# Patient Record
Sex: FEMALE | Race: WHITE | URBAN - METROPOLITAN AREA
[De-identification: names, ages, dates, MRNs, and addresses within clinical notes are randomized per-mention and may not be internally consistent; named-entity substitution may affect disease eponyms.]

---

## 2021-12-23 ENCOUNTER — EMERGENCY (EMERGENCY)
Facility: HOSPITAL | Age: 51
LOS: 0 days | Discharge: HOME | End: 2021-12-23
Attending: EMERGENCY MEDICINE | Admitting: EMERGENCY MEDICINE
Payer: COMMERCIAL

## 2021-12-23 VITALS
DIASTOLIC BLOOD PRESSURE: 81 MMHG | TEMPERATURE: 98 F | OXYGEN SATURATION: 100 % | HEART RATE: 95 BPM | SYSTOLIC BLOOD PRESSURE: 157 MMHG | RESPIRATION RATE: 18 BRPM

## 2021-12-23 DIAGNOSIS — I49.1 ATRIAL PREMATURE DEPOLARIZATION: ICD-10-CM

## 2021-12-23 DIAGNOSIS — R00.2 PALPITATIONS: ICD-10-CM

## 2021-12-23 DIAGNOSIS — F17.210 NICOTINE DEPENDENCE, CIGARETTES, UNCOMPLICATED: ICD-10-CM

## 2021-12-23 LAB
ALBUMIN SERPL ELPH-MCNC: 4 G/DL — SIGNIFICANT CHANGE UP (ref 3.5–5.2)
ALP SERPL-CCNC: 76 U/L — SIGNIFICANT CHANGE UP (ref 30–115)
ALT FLD-CCNC: 28 U/L — SIGNIFICANT CHANGE UP (ref 0–41)
ANION GAP SERPL CALC-SCNC: 11 MMOL/L — SIGNIFICANT CHANGE UP (ref 7–14)
AST SERPL-CCNC: 19 U/L — SIGNIFICANT CHANGE UP (ref 0–41)
BASOPHILS # BLD AUTO: 0.06 K/UL — SIGNIFICANT CHANGE UP (ref 0–0.2)
BASOPHILS NFR BLD AUTO: 0.6 % — SIGNIFICANT CHANGE UP (ref 0–1)
BILIRUB SERPL-MCNC: 0.5 MG/DL — SIGNIFICANT CHANGE UP (ref 0.2–1.2)
BUN SERPL-MCNC: 10 MG/DL — SIGNIFICANT CHANGE UP (ref 10–20)
CALCIUM SERPL-MCNC: 8.8 MG/DL — SIGNIFICANT CHANGE UP (ref 8.5–10.1)
CHLORIDE SERPL-SCNC: 100 MMOL/L — SIGNIFICANT CHANGE UP (ref 98–110)
CO2 SERPL-SCNC: 25 MMOL/L — SIGNIFICANT CHANGE UP (ref 17–32)
CREAT SERPL-MCNC: 0.8 MG/DL — SIGNIFICANT CHANGE UP (ref 0.7–1.5)
EOSINOPHIL # BLD AUTO: 0.26 K/UL — SIGNIFICANT CHANGE UP (ref 0–0.7)
EOSINOPHIL NFR BLD AUTO: 2.7 % — SIGNIFICANT CHANGE UP (ref 0–8)
GLUCOSE SERPL-MCNC: 158 MG/DL — HIGH (ref 70–99)
HCG SERPL QL: NEGATIVE — SIGNIFICANT CHANGE UP
HCT VFR BLD CALC: 43.5 % — SIGNIFICANT CHANGE UP (ref 37–47)
HGB BLD-MCNC: 14.5 G/DL — SIGNIFICANT CHANGE UP (ref 12–16)
IMM GRANULOCYTES NFR BLD AUTO: 0.7 % — HIGH (ref 0.1–0.3)
LYMPHOCYTES # BLD AUTO: 2.39 K/UL — SIGNIFICANT CHANGE UP (ref 1.2–3.4)
LYMPHOCYTES # BLD AUTO: 24.8 % — SIGNIFICANT CHANGE UP (ref 20.5–51.1)
MAGNESIUM SERPL-MCNC: 2.1 MG/DL — SIGNIFICANT CHANGE UP (ref 1.8–2.4)
MCHC RBC-ENTMCNC: 28.5 PG — SIGNIFICANT CHANGE UP (ref 27–31)
MCHC RBC-ENTMCNC: 33.3 G/DL — SIGNIFICANT CHANGE UP (ref 32–37)
MCV RBC AUTO: 85.6 FL — SIGNIFICANT CHANGE UP (ref 81–99)
MONOCYTES # BLD AUTO: 0.5 K/UL — SIGNIFICANT CHANGE UP (ref 0.1–0.6)
MONOCYTES NFR BLD AUTO: 5.2 % — SIGNIFICANT CHANGE UP (ref 1.7–9.3)
NEUTROPHILS # BLD AUTO: 6.37 K/UL — SIGNIFICANT CHANGE UP (ref 1.4–6.5)
NEUTROPHILS NFR BLD AUTO: 66 % — SIGNIFICANT CHANGE UP (ref 42.2–75.2)
NRBC # BLD: 0 /100 WBCS — SIGNIFICANT CHANGE UP (ref 0–0)
PLATELET # BLD AUTO: 280 K/UL — SIGNIFICANT CHANGE UP (ref 130–400)
POTASSIUM SERPL-MCNC: 4.3 MMOL/L — SIGNIFICANT CHANGE UP (ref 3.5–5)
POTASSIUM SERPL-SCNC: 4.3 MMOL/L — SIGNIFICANT CHANGE UP (ref 3.5–5)
PROT SERPL-MCNC: 6.9 G/DL — SIGNIFICANT CHANGE UP (ref 6–8)
RBC # BLD: 5.08 M/UL — SIGNIFICANT CHANGE UP (ref 4.2–5.4)
RBC # FLD: 14.7 % — HIGH (ref 11.5–14.5)
SODIUM SERPL-SCNC: 136 MMOL/L — SIGNIFICANT CHANGE UP (ref 135–146)
TROPONIN T SERPL-MCNC: <0.01 NG/ML — SIGNIFICANT CHANGE UP
WBC # BLD: 9.65 K/UL — SIGNIFICANT CHANGE UP (ref 4.8–10.8)
WBC # FLD AUTO: 9.65 K/UL — SIGNIFICANT CHANGE UP (ref 4.8–10.8)

## 2021-12-23 PROCEDURE — 93010 ELECTROCARDIOGRAM REPORT: CPT

## 2021-12-23 PROCEDURE — 99285 EMERGENCY DEPT VISIT HI MDM: CPT

## 2021-12-23 PROCEDURE — 71046 X-RAY EXAM CHEST 2 VIEWS: CPT | Mod: 26

## 2021-12-23 RX ORDER — METOPROLOL TARTRATE 50 MG
25 TABLET ORAL ONCE
Refills: 0 | Status: COMPLETED | OUTPATIENT
Start: 2021-12-23 | End: 2021-12-23

## 2021-12-23 RX ORDER — SODIUM CHLORIDE 9 MG/ML
1000 INJECTION, SOLUTION INTRAVENOUS ONCE
Refills: 0 | Status: COMPLETED | OUTPATIENT
Start: 2021-12-23 | End: 2021-12-23

## 2021-12-23 RX ORDER — METOPROLOL TARTRATE 50 MG
1 TABLET ORAL
Qty: 30 | Refills: 0
Start: 2021-12-23 | End: 2022-01-21

## 2021-12-23 RX ADMIN — SODIUM CHLORIDE 1000 MILLILITER(S): 9 INJECTION, SOLUTION INTRAVENOUS at 14:32

## 2021-12-23 RX ADMIN — Medication 25 MILLIGRAM(S): at 14:32

## 2021-12-23 NOTE — ED PROVIDER NOTE - OBJECTIVE STATEMENT
Pt is a 52y/o female with no sig pmhx, current cigarette smoker here for eval of intermittent episodes of palpitations that began yesterday afternoon while at rest with no aggravating/alleviating factors. Pt denies CP, SOB, Abd pain, n/v/d.

## 2021-12-23 NOTE — ED PROVIDER NOTE - ATTENDING CONTRIBUTION TO CARE
I personally evaluated patient. I agree with the findings and plan with all documentation on chart except as documented  in my note.    52 y/o healthy female who presents with palpitations. No fever or signs of infectious etiology. On rhythm strip, patient feels when she is having PAC's. No SOB or CP. Physical exam normal. IV placed, Metoprolol given, patient monitored. Labs unremarkable. EKG normal.    Patient is a good candidate to attempt outpatient management. Supportive care and home care discussed in detail. Patient aware they may have to return for re-evaluation and possible admission if outpatient treatment fails. Strict return precautions discussed. EP follow up discussed (patient had prior Holter monitor for 48 hours)    Full DC instructions discussed and patient knows when to seek immediate medical attention.  Patient has proper follow up.  All results discussed and patient aware they may require further work up.  Proper follow up ensured. Limitations of ED work up discussed.  Medications administered and prescribed/OTC home meds discussed.  All questions and concerns from patient or family addressed. Understanding of instructions verbalized.

## 2021-12-23 NOTE — ED PROVIDER NOTE - NS ED ROS FT
Eyes:  No visual changes, eye pain or discharge.  ENMT:  No hearing changes, pain, discharge or infections. No neck pain or stiffness.  Cardiac:  + palpitations No chest pain, SOB or edema. No chest pain with exertion.  Respiratory:  No cough or respiratory distress. No hemoptysis. No history of asthma or RAD.  GI:  No nausea, vomiting, diarrhea or abdominal pain.  :  No dysuria, frequency or burning.  MS:  No myalgia, muscle weakness, joint pain or back pain.  Neuro:  No headache or weakness.  No LOC.  Skin:  No skin rash.   Endocrine: No history of thyroid disease or diabetes.  Except as documented in the HPI,  all other systems are negative.

## 2021-12-23 NOTE — ED PROVIDER NOTE - CARE PROVIDERS DIRECT ADDRESSES
,DirectAddress_Unknown,[email or web address]

## 2021-12-23 NOTE — ED PROVIDER NOTE - CARE PROVIDER_API CALL
Yaw Manuel)  Cardiovascular Disease; Interventional Cardiology  [address]  [place] [address]  Phone: [phone]  Fax: [phone]  Follow Up Time:     Bryn Sanders; RAYMOND)  CardiologyElectrophyslgy [address]  [place] [address]  Phone: [phone]  Fax: [phone]  Follow Up Time:

## 2021-12-23 NOTE — ED PROVIDER NOTE - PATIENT PORTAL LINK FT
You can access the FollowMyHealth Patient Portal offered by [facility] by registering at the following website: [email or web address]. By joining [employer]’s FollowMyHealth portal, you will also be able to view your health information using other applications (apps) compatible with our system.

## 2021-12-28 ENCOUNTER — LABORATORY RESULT (OUTPATIENT)
Age: 51
End: 2021-12-28

## 2021-12-28 ENCOUNTER — APPOINTMENT (OUTPATIENT)
Dept: OBGYN | Facility: CLINIC | Age: 51
End: 2021-12-28
Payer: COMMERCIAL

## 2021-12-28 ENCOUNTER — RESULT CHARGE (OUTPATIENT)
Age: 51
End: 2021-12-28

## 2021-12-28 VITALS
HEART RATE: 93 BPM | BODY MASS INDEX: 35.32 KG/M2 | HEIGHT: 65 IN | TEMPERATURE: 98.6 F | WEIGHT: 212 LBS | OXYGEN SATURATION: 98 %

## 2021-12-28 VITALS — SYSTOLIC BLOOD PRESSURE: 114 MMHG | DIASTOLIC BLOOD PRESSURE: 72 MMHG

## 2021-12-28 DIAGNOSIS — F17.210 NICOTINE DEPENDENCE, CIGARETTES, UNCOMPLICATED: ICD-10-CM

## 2021-12-28 PROBLEM — Z00.00 ENCOUNTER FOR PREVENTIVE HEALTH EXAMINATION: Status: ACTIVE | Noted: 2021-12-28

## 2021-12-28 LAB
BILIRUB UR QL STRIP: NORMAL
GLUCOSE UR-MCNC: NORMAL
HGB UR QL STRIP.AUTO: NORMAL
KETONES UR-MCNC: NORMAL
LEUKOCYTE ESTERASE UR QL STRIP: NORMAL
NITRITE UR QL STRIP: NORMAL
PROT UR STRIP-MCNC: NORMAL

## 2021-12-28 PROCEDURE — 81003 URINALYSIS AUTO W/O SCOPE: CPT | Mod: QW

## 2021-12-28 PROCEDURE — 99396 PREV VISIT EST AGE 40-64: CPT

## 2021-12-28 NOTE — HISTORY OF PRESENT ILLNESS
[Patient reported PAP Smear was normal] : Patient reported PAP Smear was normal [Gonorrhea test offered] : Gonorrhea test offered [Trichomonas test offered] : Trichomonas test offered [HPV test offered] : HPV test offered [Chlamydia test offered] : Chlamydia test offered [N] : Patient does not use contraception [Y] : Patient is sexually active [TextBox_4] : Pt present for annual gyn exam [Mammogramdate] : 2017 [PapSmeardate] : 8/6/2018 [BoneDensityDate] : n/a [ColonoscopyDate] : -0- [MensesFreq] : 28 [MensesLength] : 4 [PGHxTotal] : 2 [[facility]FullTerm] : 2 [[facility] Hospital[facility]Living] : 2 [FreeTextEntry1] : C/S x 2

## 2021-12-28 NOTE — PLAN
[FreeTextEntry1] : Pap, GC, Chlamydia\par TVS\par Mammo\par Schedule Colonoscopy\par UA C&S\par RTO 6 mos/prn

## 2021-12-28 NOTE — COUNSELING
[Nutrition/ Exercise/ Weight Management] : nutrition, exercise, weight management [Body Image] : body image [Vitamins/Supplements] : vitamins/supplements [Smoking Cessation] : smoking cessation [Breast Self Exam] : breast self exam

## 2022-08-01 ENCOUNTER — APPOINTMENT (OUTPATIENT)
Dept: OBGYN | Facility: CLINIC | Age: 52
End: 2022-08-01

## 2022-08-01 VITALS
HEART RATE: 98 BPM | WEIGHT: 215 LBS | OXYGEN SATURATION: 98 % | SYSTOLIC BLOOD PRESSURE: 110 MMHG | BODY MASS INDEX: 35.82 KG/M2 | TEMPERATURE: 98.7 F | HEIGHT: 65 IN | DIASTOLIC BLOOD PRESSURE: 70 MMHG

## 2022-08-01 PROCEDURE — 81003 URINALYSIS AUTO W/O SCOPE: CPT | Mod: QW

## 2022-08-01 PROCEDURE — 99214 OFFICE O/P EST MOD 30 MIN: CPT

## 2022-08-01 NOTE — HISTORY OF PRESENT ILLNESS
[Patient reported PAP Smear was normal] : Patient reported PAP Smear was normal [Y] : Positive pregnancy history [TextBox_4] : Pt present to discuss Perimenopause [Mammogramdate] : 0 [PapSmeardate] : 12/2021 [BoneDensityDate] : 0 [ColonoscopyDate] : 0 [MensesFreq] : 28 [MensesLength] : 5 [PGHxTotal] : 2 [[facility]xFullTerm] : 2 [[facility]xLiving] : 2 [FreeTextEntry1] : c/s [Insomnia] : insomnia [No] : none [Herbal Options] : herbal options [Behavioral Modification] : behavioral modification

## 2022-08-01 NOTE — PLAN
[FreeTextEntry1] : Monitor cycle, no blood wk at present unless cycles become irregular\par Pt had blood work drawn PCP and was advised to take Metformin for elevated glucose/HgbA1C which she has not started; advised to follow advice of PCP re meds and F/U with him ASAP\par Schedule Colonoscopy\par Mammo\par RTO 12/22 for annual gyn exam

## 2024-03-25 ENCOUNTER — EMERGENCY (EMERGENCY)
Facility: HOSPITAL | Age: 54
LOS: 0 days | Discharge: HOME | End: 2024-03-25
Attending: EMERGENCY MEDICINE
Payer: COMMERCIAL

## 2024-03-25 VITALS
HEART RATE: 118 BPM | RESPIRATION RATE: 18 BRPM | DIASTOLIC BLOOD PRESSURE: 87 MMHG | OXYGEN SATURATION: 98 % | WEIGHT: 220.02 LBS | TEMPERATURE: 98 F | SYSTOLIC BLOOD PRESSURE: 157 MMHG | HEIGHT: 66 IN

## 2024-03-25 VITALS
RESPIRATION RATE: 16 BRPM | HEART RATE: 86 BPM | DIASTOLIC BLOOD PRESSURE: 62 MMHG | SYSTOLIC BLOOD PRESSURE: 114 MMHG | OXYGEN SATURATION: 97 %

## 2024-03-25 DIAGNOSIS — R00.2 PALPITATIONS: ICD-10-CM

## 2024-03-25 DIAGNOSIS — R00.0 TACHYCARDIA, UNSPECIFIED: ICD-10-CM

## 2024-03-25 DIAGNOSIS — Z98.891 HISTORY OF UTERINE SCAR FROM PREVIOUS SURGERY: Chronic | ICD-10-CM

## 2024-03-25 DIAGNOSIS — I49.1 ATRIAL PREMATURE DEPOLARIZATION: ICD-10-CM

## 2024-03-25 DIAGNOSIS — F17.210 NICOTINE DEPENDENCE, CIGARETTES, UNCOMPLICATED: ICD-10-CM

## 2024-03-25 LAB
ALBUMIN SERPL ELPH-MCNC: 4.3 G/DL — SIGNIFICANT CHANGE UP (ref 3.5–5.2)
ALP SERPL-CCNC: 88 U/L — SIGNIFICANT CHANGE UP (ref 30–115)
ALT FLD-CCNC: 30 U/L — SIGNIFICANT CHANGE UP (ref 0–41)
ANION GAP SERPL CALC-SCNC: 12 MMOL/L — SIGNIFICANT CHANGE UP (ref 7–14)
AST SERPL-CCNC: 19 U/L — SIGNIFICANT CHANGE UP (ref 0–41)
BASOPHILS # BLD AUTO: 0.05 K/UL — SIGNIFICANT CHANGE UP (ref 0–0.2)
BASOPHILS NFR BLD AUTO: 0.7 % — SIGNIFICANT CHANGE UP (ref 0–1)
BILIRUB SERPL-MCNC: 0.5 MG/DL — SIGNIFICANT CHANGE UP (ref 0.2–1.2)
BUN SERPL-MCNC: 12 MG/DL — SIGNIFICANT CHANGE UP (ref 10–20)
CALCIUM SERPL-MCNC: 9.4 MG/DL — SIGNIFICANT CHANGE UP (ref 8.4–10.5)
CHLORIDE SERPL-SCNC: 99 MMOL/L — SIGNIFICANT CHANGE UP (ref 98–110)
CO2 SERPL-SCNC: 26 MMOL/L — SIGNIFICANT CHANGE UP (ref 17–32)
CREAT SERPL-MCNC: 0.9 MG/DL — SIGNIFICANT CHANGE UP (ref 0.7–1.5)
EGFR: 77 ML/MIN/1.73M2 — SIGNIFICANT CHANGE UP
EOSINOPHIL # BLD AUTO: 0.24 K/UL — SIGNIFICANT CHANGE UP (ref 0–0.7)
EOSINOPHIL NFR BLD AUTO: 3.2 % — SIGNIFICANT CHANGE UP (ref 0–8)
GLUCOSE SERPL-MCNC: 183 MG/DL — HIGH (ref 70–99)
HCG SERPL QL: NEGATIVE — SIGNIFICANT CHANGE UP
HCT VFR BLD CALC: 46 % — SIGNIFICANT CHANGE UP (ref 37–47)
HGB BLD-MCNC: 15.7 G/DL — SIGNIFICANT CHANGE UP (ref 12–16)
IMM GRANULOCYTES NFR BLD AUTO: 0.4 % — HIGH (ref 0.1–0.3)
LYMPHOCYTES # BLD AUTO: 1.65 K/UL — SIGNIFICANT CHANGE UP (ref 1.2–3.4)
LYMPHOCYTES # BLD AUTO: 21.8 % — SIGNIFICANT CHANGE UP (ref 20.5–51.1)
MAGNESIUM SERPL-MCNC: 1.9 MG/DL — SIGNIFICANT CHANGE UP (ref 1.8–2.4)
MCHC RBC-ENTMCNC: 29.1 PG — SIGNIFICANT CHANGE UP (ref 27–31)
MCHC RBC-ENTMCNC: 34.1 G/DL — SIGNIFICANT CHANGE UP (ref 32–37)
MCV RBC AUTO: 85.3 FL — SIGNIFICANT CHANGE UP (ref 81–99)
MONOCYTES # BLD AUTO: 0.46 K/UL — SIGNIFICANT CHANGE UP (ref 0.1–0.6)
MONOCYTES NFR BLD AUTO: 6.1 % — SIGNIFICANT CHANGE UP (ref 1.7–9.3)
NEUTROPHILS # BLD AUTO: 5.14 K/UL — SIGNIFICANT CHANGE UP (ref 1.4–6.5)
NEUTROPHILS NFR BLD AUTO: 67.8 % — SIGNIFICANT CHANGE UP (ref 42.2–75.2)
NRBC # BLD: 0 /100 WBCS — SIGNIFICANT CHANGE UP (ref 0–0)
PLATELET # BLD AUTO: 262 K/UL — SIGNIFICANT CHANGE UP (ref 130–400)
PMV BLD: 9.7 FL — SIGNIFICANT CHANGE UP (ref 7.4–10.4)
POTASSIUM SERPL-MCNC: 4.6 MMOL/L — SIGNIFICANT CHANGE UP (ref 3.5–5)
POTASSIUM SERPL-SCNC: 4.6 MMOL/L — SIGNIFICANT CHANGE UP (ref 3.5–5)
PROT SERPL-MCNC: 7 G/DL — SIGNIFICANT CHANGE UP (ref 6–8)
RBC # BLD: 5.39 M/UL — SIGNIFICANT CHANGE UP (ref 4.2–5.4)
RBC # FLD: 13.9 % — SIGNIFICANT CHANGE UP (ref 11.5–14.5)
SODIUM SERPL-SCNC: 137 MMOL/L — SIGNIFICANT CHANGE UP (ref 135–146)
WBC # BLD: 7.57 K/UL — SIGNIFICANT CHANGE UP (ref 4.8–10.8)
WBC # FLD AUTO: 7.57 K/UL — SIGNIFICANT CHANGE UP (ref 4.8–10.8)

## 2024-03-25 PROCEDURE — 80053 COMPREHEN METABOLIC PANEL: CPT

## 2024-03-25 PROCEDURE — 71045 X-RAY EXAM CHEST 1 VIEW: CPT | Mod: 26

## 2024-03-25 PROCEDURE — 71045 X-RAY EXAM CHEST 1 VIEW: CPT

## 2024-03-25 PROCEDURE — 93010 ELECTROCARDIOGRAM REPORT: CPT

## 2024-03-25 PROCEDURE — 93005 ELECTROCARDIOGRAM TRACING: CPT

## 2024-03-25 PROCEDURE — 99284 EMERGENCY DEPT VISIT MOD MDM: CPT

## 2024-03-25 PROCEDURE — 36415 COLL VENOUS BLD VENIPUNCTURE: CPT

## 2024-03-25 PROCEDURE — 85025 COMPLETE CBC W/AUTO DIFF WBC: CPT

## 2024-03-25 PROCEDURE — 83735 ASSAY OF MAGNESIUM: CPT

## 2024-03-25 PROCEDURE — 99285 EMERGENCY DEPT VISIT HI MDM: CPT | Mod: 25

## 2024-03-25 PROCEDURE — 84703 CHORIONIC GONADOTROPIN ASSAY: CPT

## 2024-03-25 RX ORDER — METOPROLOL TARTRATE 50 MG
0.5 TABLET ORAL
Qty: 7 | Refills: 0
Start: 2024-03-25 | End: 2024-03-31

## 2024-03-25 RX ORDER — SODIUM CHLORIDE 9 MG/ML
1000 INJECTION INTRAMUSCULAR; INTRAVENOUS; SUBCUTANEOUS ONCE
Refills: 0 | Status: COMPLETED | OUTPATIENT
Start: 2024-03-25 | End: 2024-03-25

## 2024-03-25 RX ORDER — METOPROLOL TARTRATE 50 MG
12.5 TABLET ORAL ONCE
Refills: 0 | Status: DISCONTINUED | OUTPATIENT
Start: 2024-03-25 | End: 2024-03-25

## 2024-03-25 RX ORDER — ALPRAZOLAM 0.25 MG
0.25 TABLET ORAL ONCE
Refills: 0 | Status: DISCONTINUED | OUTPATIENT
Start: 2024-03-25 | End: 2024-03-25

## 2024-03-25 RX ORDER — METOPROLOL TARTRATE 50 MG
25 TABLET ORAL ONCE
Refills: 0 | Status: COMPLETED | OUTPATIENT
Start: 2024-03-25 | End: 2024-03-25

## 2024-03-25 RX ADMIN — SODIUM CHLORIDE 1000 MILLILITER(S): 9 INJECTION INTRAMUSCULAR; INTRAVENOUS; SUBCUTANEOUS at 09:44

## 2024-03-25 RX ADMIN — Medication 25 MILLIGRAM(S): at 10:58

## 2024-03-25 RX ADMIN — Medication 0.25 MILLIGRAM(S): at 09:44

## 2024-03-25 NOTE — ED PROVIDER NOTE - PATIENT PORTAL LINK FT
You can access the FollowMyHealth Patient Portal offered by [facility] by registering at the following website: [email or web address]. By joining [employer]’s FollowMyHealth portal, you will also be able to view your health information using other applications (apps) compatible with our system.

## 2024-03-25 NOTE — ED PROVIDER NOTE - EKG/XRAY ADDITIONAL INFORMATION
EKG shows sinus tachycardia at 104.  There are occasional premature supraventricular complexes.  Axis is normal.  There is no ischemic changes. EKG shows sinus tachycardia at 104.  There are occasional premature supraventricular complexes.  Axis is normal.  There are no ischemic changes.    Repeat EKG shows rate 95.

## 2024-03-25 NOTE — ED ADULT NURSE NOTE - NSFALLUNIVINTERV_ED_ALL_ED
Bed/Stretcher in lowest position, wheels locked, appropriate side rails in place/Call bell, personal items and telephone in reach/Instruct patient to call for assistance before getting out of bed/chair/stretcher/Non-slip footwear applied when patient is off stretcher/[place] to call system/Physically safe environment - no spills, clutter or unnecessary equipment/Purposeful proactive rounding/Room/bathroom lighting operational, light cord in reach

## 2024-03-25 NOTE — ED PROVIDER NOTE - OBJECTIVE STATEMENT
54 y/o female current smoker presents to the ED with palpitations since last night. patient took semaglutide injection at 1700 yesterday for first time. patient c/o skipping beats and racing heart beat. no chest pain or sob. patient feeling anxious. no tingling to extremities. no back pain , dizziness.

## 2024-03-25 NOTE — ED PROVIDER NOTE - NSFOLLOWUPINSTRUCTIONS_ED_ALL_ED_FT
Our Emergency Department Referral Coordinators will be reaching out to you in the next 24-48 hours from 9:00am to 5:00pm with a follow up appointment. Please expect a phone call from the hospital in that time frame. If you do not receive a call or if you have any questions or concerns, you can reach them at   [phone]        Palpitations    A palpitation is the feeling that your heartbeat is irregular or is faster than normal. It may feel like your heart is fluttering or skipping a beat. They may be caused by many things, including smoking, caffeine, alcohol, stress, and certain medicines. Although most causes of palpitations are not serious, palpitations can be a sign of a serious medical problem. Avoid caffeine, alcohol, and tobacco products at home. Try to reduce stress and anxiety and make sure to get plenty of rest.     SEEK IMMEDIATE MEDICAL CARE IF YOU HAVE ANY OF THE FOLLOWING SYMPTOMS: chest pain, shortness of breath, severe headache, dizziness/lightheadedness, or fainting.

## 2024-03-25 NOTE — ED PROVIDER NOTE - CARE PROVIDER_API CALL
Eladio Hay  Pulmonary Disease  [address] [place] [id number]  Phone: [phone]  Fax: [phone]  Follow Up Time:

## 2024-03-25 NOTE — ED PROVIDER NOTE - PHYSICAL EXAMINATION
generalized: normocephalic , well appearing, anxious  eyes: PERRLA, EOMI, clear conjunctiva  resp: CTA, no resp distress, no chest wall tenderness or crepitation  cardiac: tachycardic, regular rhythm,  S1S2, no murmurs, +extrasystoles  abdomen: NT/ND, BSx4, no CVA tenderness, no palpable mass  msk: no calf tenderness or swelling  skin: no redness or rashes  neuro: AOx3, gait steady, speech clear, motor and sensory in tact

## 2024-03-25 NOTE — ED PROVIDER NOTE - ATTENDING APP SHARED VISIT CONTRIBUTION OF CARE
Patient complains of palpitations.  She has had this complaint in the past with a negative workup by her PMD.  She states the symptoms occurred after taking Ozempic.  She denies rash, shortness of breath, chest pain.  Vital signs noted.  On cardiac monitor heart rate varies between 90 and 110 during my examination.  Is 0-1 PACs noted.  Patient is a has no fever.  He has had no infectious complaints.  ENT no throat redness. Chest clear.  Heart regular rate no murmur.  Abdomen nontender.  There is no goiter.  Diagnostic workup reviewed.  There is no ischemia on EKG.  White blood cell count is not elevated.  Chemistry shows no metabolic derangement.  Symptoms may be due to medication effect.  However, palpitations is not a known side effect of Ozempic in my opinion.  In my opinion, obstructive sleep apnea is contemplated.  In my opinion, outpatient treatment and follow up are appropriate.

## 2024-03-27 PROBLEM — Z78.9 OTHER SPECIFIED HEALTH STATUS: Chronic | Status: ACTIVE | Noted: 2024-03-25

## 2024-04-02 NOTE — CHART NOTE - NSCHARTNOTEFT_GEN_A_CORE
vmail not set up 3/26, MV - emailed pulm 3/27, MV - schd for 4/22
vmail not set up 3/26, MV - patient would like both appts, handed off to  for cardio 3/27, MV/ schedule don 4/11/24 @ 3:30p w/ Dr. Young @ [facility] 3/27-

## 2024-04-11 ENCOUNTER — APPOINTMENT (OUTPATIENT)
Dept: CARDIOLOGY | Facility: CLINIC | Age: 54
End: 2024-04-11

## 2024-04-22 ENCOUNTER — APPOINTMENT (OUTPATIENT)
Dept: PULMONOLOGY | Facility: CLINIC | Age: 54
End: 2024-04-22
Payer: COMMERCIAL

## 2024-04-22 VITALS
OXYGEN SATURATION: 97 % | BODY MASS INDEX: 37.44 KG/M2 | HEART RATE: 112 BPM | SYSTOLIC BLOOD PRESSURE: 130 MMHG | DIASTOLIC BLOOD PRESSURE: 70 MMHG | WEIGHT: 225 LBS

## 2024-04-22 DIAGNOSIS — G47.33 OBSTRUCTIVE SLEEP APNEA (ADULT) (PEDIATRIC): ICD-10-CM

## 2024-04-22 DIAGNOSIS — Z87.2 PERSONAL HISTORY OF DISEASES OF THE SKIN AND SUBCUTANEOUS TISSUE: ICD-10-CM

## 2024-04-22 DIAGNOSIS — F17.200 NICOTINE DEPENDENCE, UNSPECIFIED, UNCOMPLICATED: ICD-10-CM

## 2024-04-22 DIAGNOSIS — Z81.8 FAMILY HISTORY OF OTHER MENTAL AND BEHAVIORAL DISORDERS: ICD-10-CM

## 2024-04-22 DIAGNOSIS — E66.01 MORBID (SEVERE) OBESITY DUE TO EXCESS CALORIES: ICD-10-CM

## 2024-04-22 DIAGNOSIS — Z12.2 ENCOUNTER FOR SCREENING FOR MALIGNANT NEOPLASM OF RESPIRATORY ORGANS: ICD-10-CM

## 2024-04-22 PROCEDURE — G0296 VISIT TO DETERM LDCT ELIG: CPT

## 2024-04-22 PROCEDURE — 99203 OFFICE O/P NEW LOW 30 MIN: CPT

## 2024-04-22 NOTE — PLAN
[TextEntry] : PFT ordered  Counseled for smoking cessation Low-dose CT scan ordered told the patient to call me if she do it will proceed with sleep study  counseled for weight reduction  told not to drive if feel tired  counseled for sleep hygiene

## 2024-04-22 NOTE — HISTORY OF PRESENT ILLNESS
[Initial Evaluation] : an initial evaluation of [Excessive Daytime Sleepiness] : excessive daytime sleepiness [Witnessed Apnea During Sleep] : witnessed apnea during sleep [Snoring] : snoring [Unrefreshing Sleep] : unrefreshing sleep [Sleepy When Sedentary] : sleepy when sedentary [Irritability] : irritability [Currently Experiencing] : The patient is currently experiencing symptoms. [Obesity] : obesity

## 2024-04-22 NOTE — REASON FOR VISIT
[Initial] : an initial visit [Sleep Apnea] : sleep apnea [TextEntry] : This is a 53-year-old female present for evaluation for sleep apnea.  Patient reported that she has all the lower symptoms of ADDIE such as snoring at night wake up multiple at night time at night irritability wake up sometimes she feels like she will be gasping or choking. Also complaining of morning fatigue and daytime somnolence patient been gaining weight and not able to lose weight. She denies any cough wheezing or shortness of breath Patient she is a smoker been smoking for more than 25 years 1 pack/day told her that she meets criteria for screening yearly CAT scan and she agreed to follow

## 2024-04-24 ENCOUNTER — APPOINTMENT (OUTPATIENT)
Dept: CARDIOLOGY | Facility: CLINIC | Age: 54
End: 2024-04-24
Payer: COMMERCIAL

## 2024-04-24 VITALS
HEART RATE: 114 BPM | SYSTOLIC BLOOD PRESSURE: 124 MMHG | HEIGHT: 65 IN | WEIGHT: 225 LBS | DIASTOLIC BLOOD PRESSURE: 78 MMHG | BODY MASS INDEX: 37.49 KG/M2

## 2024-04-24 DIAGNOSIS — R94.31 ABNORMAL ELECTROCARDIOGRAM [ECG] [EKG]: ICD-10-CM

## 2024-04-24 DIAGNOSIS — E78.5 HYPERLIPIDEMIA, UNSPECIFIED: ICD-10-CM

## 2024-04-24 DIAGNOSIS — R06.02 SHORTNESS OF BREATH: ICD-10-CM

## 2024-04-24 DIAGNOSIS — R00.2 PALPITATIONS: ICD-10-CM

## 2024-04-24 PROCEDURE — 93000 ELECTROCARDIOGRAM COMPLETE: CPT

## 2024-04-24 PROCEDURE — 99204 OFFICE O/P NEW MOD 45 MIN: CPT | Mod: 25

## 2024-04-25 NOTE — REVIEW OF SYSTEMS
You may resume normal diet as tolerated  You may not operate a vehicle during the next 24 hours  You may not engage in an occupation involving machinery or appliances during the next 24 hours  You may not drink any alcoholic beverages during the next 24 hours  You may immediately resume taking medications that have been previously prescribed by your physician     If you have any problems relating to your treatment recovery call your Psychiatrist or ECT RN Coordinator at [phone] or to [phone].     Please make an appointment with your primary psychiatrist for a follow up appointment.    [Fever] : no fever [Chills] : no chills [SOB] : no shortness of breath [Dyspnea on exertion] : dyspnea during exertion [Chest Discomfort] : no chest discomfort [Lower Ext Edema] : no extremity edema [Palpitations] : palpitations [Syncope] : no syncope [Convulsions] : no convulsions [Confusion] : no confusion was observed [Negative] : Gastrointestinal

## 2024-04-25 NOTE — HISTORY OF PRESENT ILLNESS
[FreeTextEntry1] : I have the pleasure to see Ms Soto today for cardiology evaluation. She is a 53 year old female patient with PMHx of DL, ADDIE. She complain so mild intermittent palpitation and non specific dyspnea on exertion. denies chest pain or syncope no active sob EKG showed sinus rhythm non specific changes  currently non smoker no illicit drugs

## 2024-04-25 NOTE — ASSESSMENT
[FreeTextEntry1] : 53 year old female patient , PMHx of DL, obesity sleep apnea, here for cardiology evaluation and establish care  #Dyspnea on exertion non specific non specific changes on EKG no evidence of acute ischemia or acute decompensated heart failure at this point.  will do stress echo   #Mild intermittent palpitations not very frequent  patient advised with lifestyle modification first with decrease caffeine intake, keep hydrated, good sleep pattern and avoid stress as much as possible if symptoms worsen will consider MCOT   #DL  not compliant with diet will try diet control , exercise and weight loss will repeat lipid panel in 3m if still elevated will consider statin   Follow up in 3m  If new or worsening symptoms patient advised to seek medical attention.

## 2024-04-26 ENCOUNTER — APPOINTMENT (OUTPATIENT)
Dept: PULMONOLOGY | Facility: HOSPITAL | Age: 54
End: 2024-04-26

## 2024-06-08 ENCOUNTER — OUTPATIENT (OUTPATIENT)
Dept: OUTPATIENT SERVICES | Facility: HOSPITAL | Age: 54
LOS: 1 days | Discharge: HOME | End: 2024-06-08
Payer: COMMERCIAL

## 2024-06-08 ENCOUNTER — APPOINTMENT (OUTPATIENT)
Dept: SLEEP CENTER | Facility: HOSPITAL | Age: 54
End: 2024-06-08

## 2024-06-08 DIAGNOSIS — Z98.891 HISTORY OF UTERINE SCAR FROM PREVIOUS SURGERY: Chronic | ICD-10-CM

## 2024-06-08 DIAGNOSIS — G47.33 OBSTRUCTIVE SLEEP APNEA (ADULT) (PEDIATRIC): ICD-10-CM

## 2024-06-08 PROCEDURE — 95810 POLYSOM 6/> YRS 4/> PARAM: CPT | Mod: 26

## 2024-06-08 PROCEDURE — 95810 POLYSOM 6/> YRS 4/> PARAM: CPT

## 2024-06-11 DIAGNOSIS — G47.33 OBSTRUCTIVE SLEEP APNEA (ADULT) (PEDIATRIC): ICD-10-CM

## 2024-06-24 ENCOUNTER — APPOINTMENT (OUTPATIENT)
Dept: OBGYN | Facility: CLINIC | Age: 54
End: 2024-06-24
Payer: COMMERCIAL

## 2024-06-24 VITALS
HEART RATE: 98 BPM | TEMPERATURE: 98 F | SYSTOLIC BLOOD PRESSURE: 124 MMHG | DIASTOLIC BLOOD PRESSURE: 78 MMHG | OXYGEN SATURATION: 97 % | HEIGHT: 65 IN | WEIGHT: 220 LBS | BODY MASS INDEX: 36.65 KG/M2

## 2024-06-24 DIAGNOSIS — Z11.3 ENCOUNTER FOR SCREENING FOR INFECTIONS WITH A PREDOMINANTLY SEXUAL MODE OF TRANSMISSION: ICD-10-CM

## 2024-06-24 DIAGNOSIS — Z82.0 FAMILY HISTORY OF EPILEPSY AND OTHER DISEASES OF THE NERVOUS SYSTEM: ICD-10-CM

## 2024-06-24 DIAGNOSIS — F17.200 NICOTINE DEPENDENCE, UNSPECIFIED, UNCOMPLICATED: ICD-10-CM

## 2024-06-24 DIAGNOSIS — Z83.3 FAMILY HISTORY OF DIABETES MELLITUS: ICD-10-CM

## 2024-06-24 DIAGNOSIS — Z80.0 FAMILY HISTORY OF MALIGNANT NEOPLASM OF DIGESTIVE ORGANS: ICD-10-CM

## 2024-06-24 DIAGNOSIS — Z01.419 ENCOUNTER FOR GYNECOLOGICAL EXAMINATION (GENERAL) (ROUTINE) W/OUT ABNORMAL FINDINGS: ICD-10-CM

## 2024-06-24 DIAGNOSIS — N95.1 MENOPAUSAL AND FEMALE CLIMACTERIC STATES: ICD-10-CM

## 2024-06-24 DIAGNOSIS — R31.29 OTHER MICROSCOPIC HEMATURIA: ICD-10-CM

## 2024-06-24 DIAGNOSIS — Z82.49 FAMILY HISTORY OF ISCHEMIC HEART DISEASE AND OTHER DISEASES OF THE CIRCULATORY SYSTEM: ICD-10-CM

## 2024-06-24 LAB
BILIRUB UR QL STRIP: NEGATIVE
CLARITY UR: NORMAL
COLLECTION METHOD: NORMAL
GLUCOSE UR-MCNC: NEGATIVE
HCG UR QL: 0.2 EU/DL
HGB UR QL STRIP.AUTO: NORMAL
KETONES UR-MCNC: NEGATIVE
LEUKOCYTE ESTERASE UR QL STRIP: NORMAL
NITRITE UR QL STRIP: NEGATIVE
PH UR STRIP: 6.5
PROT UR STRIP-MCNC: NEGATIVE
SP GR UR STRIP: 1.01

## 2024-06-24 PROCEDURE — 99459 PELVIC EXAMINATION: CPT

## 2024-06-24 PROCEDURE — 81003 URINALYSIS AUTO W/O SCOPE: CPT | Mod: QW

## 2024-06-24 PROCEDURE — 99396 PREV VISIT EST AGE 40-64: CPT | Mod: 25

## 2024-06-26 LAB
C TRACH RRNA SPEC QL NAA+PROBE: NOT DETECTED
N GONORRHOEA RRNA SPEC QL NAA+PROBE: NOT DETECTED
SOURCE AMPLIFICATION: NORMAL
SOURCE AMPLIFICATION: NORMAL
T VAGINALIS RRNA SPEC QL NAA+PROBE: NOT DETECTED

## 2024-06-27 DIAGNOSIS — B96.89 ACUTE VAGINITIS: ICD-10-CM

## 2024-06-27 DIAGNOSIS — N76.0 ACUTE VAGINITIS: ICD-10-CM

## 2024-06-27 LAB — HPV HIGH+LOW RISK DNA PNL CVX: NOT DETECTED

## 2024-07-03 ENCOUNTER — APPOINTMENT (OUTPATIENT)
Dept: PULMONOLOGY | Facility: CLINIC | Age: 54
End: 2024-07-03
Payer: COMMERCIAL

## 2024-07-03 VITALS
SYSTOLIC BLOOD PRESSURE: 114 MMHG | BODY MASS INDEX: 36.65 KG/M2 | HEART RATE: 107 BPM | OXYGEN SATURATION: 95 % | RESPIRATION RATE: 14 BRPM | WEIGHT: 220 LBS | DIASTOLIC BLOOD PRESSURE: 78 MMHG | HEIGHT: 65 IN

## 2024-07-03 DIAGNOSIS — E66.01 MORBID (SEVERE) OBESITY DUE TO EXCESS CALORIES: ICD-10-CM

## 2024-07-03 DIAGNOSIS — Z12.2 ENCOUNTER FOR SCREENING FOR MALIGNANT NEOPLASM OF RESPIRATORY ORGANS: ICD-10-CM

## 2024-07-03 DIAGNOSIS — G47.33 OBSTRUCTIVE SLEEP APNEA (ADULT) (PEDIATRIC): ICD-10-CM

## 2024-07-03 PROCEDURE — 99214 OFFICE O/P EST MOD 30 MIN: CPT

## 2024-07-26 ENCOUNTER — RESULT REVIEW (OUTPATIENT)
Age: 54
End: 2024-07-26

## 2024-08-05 ENCOUNTER — APPOINTMENT (OUTPATIENT)
Dept: CARDIOLOGY | Facility: CLINIC | Age: 54
End: 2024-08-05

## 2024-08-08 ENCOUNTER — APPOINTMENT (OUTPATIENT)
Dept: CARDIOLOGY | Facility: CLINIC | Age: 54
End: 2024-08-08

## 2024-08-08 PROCEDURE — 93351 STRESS TTE COMPLETE: CPT

## 2024-08-08 PROCEDURE — 93325 DOPPLER ECHO COLOR FLOW MAPG: CPT

## 2024-08-08 PROCEDURE — 93320 DOPPLER ECHO COMPLETE: CPT

## 2024-08-15 ENCOUNTER — APPOINTMENT (OUTPATIENT)
Dept: CARDIOLOGY | Facility: CLINIC | Age: 54
End: 2024-08-15
Payer: COMMERCIAL

## 2024-08-15 VITALS
HEIGHT: 65 IN | SYSTOLIC BLOOD PRESSURE: 116 MMHG | WEIGHT: 220 LBS | HEART RATE: 92 BPM | DIASTOLIC BLOOD PRESSURE: 78 MMHG | BODY MASS INDEX: 36.65 KG/M2

## 2024-08-15 DIAGNOSIS — R00.2 PALPITATIONS: ICD-10-CM

## 2024-08-15 DIAGNOSIS — R06.02 SHORTNESS OF BREATH: ICD-10-CM

## 2024-08-15 DIAGNOSIS — E78.5 HYPERLIPIDEMIA, UNSPECIFIED: ICD-10-CM

## 2024-08-15 PROCEDURE — 93000 ELECTROCARDIOGRAM COMPLETE: CPT

## 2024-08-15 PROCEDURE — 99214 OFFICE O/P EST MOD 30 MIN: CPT | Mod: 25

## 2024-08-16 NOTE — ASSESSMENT
[FreeTextEntry1] : 53 year old female patient , PMHx of DL, obesity sleep apnea, here for cardiology evaluation and establish care  #Dyspnea on exertion non specific currently better  non specific changes on EKG no evidence of acute ischemia or acute decompensated heart failure at this point.  stress echo normal EF, negative for ischemia.   #intermittent palpitations still present,  patient advised with lifestyle modification first with decrease caffeine intake, keep hydrated, good sleep pattern and avoid stress as much as possible will place MCOT  will check TSH   #DL  not compliant with diet advised diet control , exercise and weight loss lipid panel not done , reordered again   Follow up in 2-3m  If new or worsening symptoms patient advised to seek medical attention.

## 2024-08-16 NOTE — REVIEW OF SYSTEMS
[Fever] : no fever [Chills] : no chills [SOB] : no shortness of breath [Dyspnea on exertion] : not dyspnea during exertion [Chest Discomfort] : no chest discomfort [Lower Ext Edema] : no extremity edema [Palpitations] : palpitations [Syncope] : no syncope [Convulsions] : no convulsions [Confusion] : no confusion was observed [Negative] : Gastrointestinal

## 2024-08-16 NOTE — HISTORY OF PRESENT ILLNESS
[FreeTextEntry1] : I have the pleasure to see Ms Soto today for cardiology evaluation. She is a 53 year old female patient with PMHx of DL, ADDIE. She complain so mild intermittent palpitation and non specific dyspnea on exertion. denies chest pain or syncope no active sob EKG showed sinus rhythm non specific changes  underwent stress echo 8/8/2024 normal EF, negative for ischemia she still complaining of intermittent palpitations.    currently non smoker no illicit drugs

## 2024-08-21 ENCOUNTER — APPOINTMENT (OUTPATIENT)
Dept: PULMONOLOGY | Facility: CLINIC | Age: 54
End: 2024-08-21

## 2024-11-13 ENCOUNTER — APPOINTMENT (OUTPATIENT)
Dept: CARDIOLOGY | Facility: CLINIC | Age: 54
End: 2024-11-13